# Patient Record
Sex: FEMALE | Race: WHITE | Employment: STUDENT | URBAN - NONMETROPOLITAN AREA
[De-identification: names, ages, dates, MRNs, and addresses within clinical notes are randomized per-mention and may not be internally consistent; named-entity substitution may affect disease eponyms.]

---
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
## 2022-09-10 ENCOUNTER — HOSPITAL ENCOUNTER (OUTPATIENT)
Facility: HOSPITAL | Age: 22
Discharge: HOME | End: 2022-09-10
Attending: OBSTETRICS & GYNECOLOGY | Admitting: OBSTETRICS & GYNECOLOGY

## 2022-09-10 VITALS
SYSTOLIC BLOOD PRESSURE: 123 MMHG | TEMPERATURE: 98 F | RESPIRATION RATE: 16 BRPM | DIASTOLIC BLOOD PRESSURE: 55 MMHG | OXYGEN SATURATION: 99 % | WEIGHT: 154 LBS | BODY MASS INDEX: 27.29 KG/M2 | HEIGHT: 63 IN | HEART RATE: 72 BPM

## 2022-09-10 LAB
ABO GROUP BLD: NORMAL
BLD GP AB SCN SERPL QL: NEGATIVE
HGB F BLD QL KLEIH BETKE: NORMAL
RH BLD: POSITIVE
T&S EXPIRATION DATE: NORMAL

## 2022-09-10 PROCEDURE — 85460 HEMOGLOBIN FETAL: CPT | Performed by: OBSTETRICS & GYNECOLOGY

## 2022-09-10 PROCEDURE — 36415 COLL VENOUS BLD VENIPUNCTURE: CPT | Performed by: OBSTETRICS & GYNECOLOGY

## 2022-09-10 PROCEDURE — 86850 RBC ANTIBODY SCREEN: CPT | Performed by: OBSTETRICS & GYNECOLOGY

## 2022-09-10 PROCEDURE — G0378 HOSPITAL OBSERVATION PER HR: HCPCS

## 2022-09-10 PROCEDURE — G0463 HOSPITAL OUTPT CLINIC VISIT: HCPCS

## 2022-09-10 PROCEDURE — 86901 BLOOD TYPING SEROLOGIC RH(D): CPT | Performed by: OBSTETRICS & GYNECOLOGY

## 2022-09-10 PROCEDURE — 86900 BLOOD TYPING SEROLOGIC ABO: CPT | Performed by: OBSTETRICS & GYNECOLOGY
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
## 2022-09-12 ENCOUNTER — HOSPITAL ENCOUNTER (OUTPATIENT)
Facility: HOSPITAL | Age: 22
Discharge: HOME | End: 2022-09-12
Attending: OBSTETRICS & GYNECOLOGY | Admitting: OBSTETRICS & GYNECOLOGY

## 2022-09-12 ENCOUNTER — HOSPITAL ENCOUNTER (OUTPATIENT)
Facility: HOSPITAL | Age: 22
End: 2022-09-12
Attending: OBSTETRICS & GYNECOLOGY | Admitting: OBSTETRICS & GYNECOLOGY

## 2022-09-12 VITALS
WEIGHT: 153 LBS | BODY MASS INDEX: 28.16 KG/M2 | HEART RATE: 85 BPM | DIASTOLIC BLOOD PRESSURE: 77 MMHG | SYSTOLIC BLOOD PRESSURE: 131 MMHG | RESPIRATION RATE: 16 BRPM | HEIGHT: 62 IN | TEMPERATURE: 98.5 F

## 2022-09-12 PROBLEM — Z34.90 PREGNANCY: Status: ACTIVE | Noted: 2022-09-12

## 2022-09-12 PROCEDURE — G0378 HOSPITAL OBSERVATION PER HR: HCPCS

## 2022-09-12 PROCEDURE — G0463 HOSPITAL OUTPT CLINIC VISIT: HCPCS

## 2022-09-13 NOTE — NURSING NOTE
No vaginal bleeding during observation in LDR. No contractions and pt denies any pain.     Karne Goss RN  22:43 CDT

## 2023-08-05 ENCOUNTER — HOSPITAL ENCOUNTER (EMERGENCY)
Facility: HOSPITAL | Age: 23
Discharge: HOME | End: 2023-08-05
Payer: COMMERCIAL

## 2023-08-05 VITALS
DIASTOLIC BLOOD PRESSURE: 69 MMHG | SYSTOLIC BLOOD PRESSURE: 120 MMHG | TEMPERATURE: 98.2 F | OXYGEN SATURATION: 98 % | RESPIRATION RATE: 16 BRPM | HEART RATE: 58 BPM | BODY MASS INDEX: 31.28 KG/M2 | HEIGHT: 62 IN | WEIGHT: 170 LBS

## 2023-08-05 DIAGNOSIS — S39.012A STRAIN OF LUMBAR PARASPINOUS MUSCLE, INITIAL ENCOUNTER: Primary | ICD-10-CM

## 2023-08-05 PROCEDURE — 25010000002 KETOROLAC TROMETHAMINE PER 15 MG

## 2023-08-05 PROCEDURE — 99283 EMERGENCY DEPT VISIT LOW MDM: CPT

## 2023-08-05 PROCEDURE — 96372 THER/PROPH/DIAG INJ SC/IM: CPT

## 2023-08-05 PROCEDURE — 25010000002 DEXAMETHASONE PER 1 MG

## 2023-08-05 RX ORDER — LIDOCAINE 50 MG/G
1 PATCH TOPICAL EVERY 24 HOURS
Qty: 30 PATCH | Refills: 0 | Status: SHIPPED | OUTPATIENT
Start: 2023-08-05 | End: 2023-09-04

## 2023-08-05 RX ORDER — LIDOCAINE 50 MG/G
1 PATCH TOPICAL ONCE
Status: DISCONTINUED | OUTPATIENT
Start: 2023-08-05 | End: 2023-08-05 | Stop reason: HOSPADM

## 2023-08-05 RX ORDER — KETOROLAC TROMETHAMINE 30 MG/ML
30 INJECTION, SOLUTION INTRAMUSCULAR; INTRAVENOUS EVERY 6 HOURS PRN
Status: DISCONTINUED | OUTPATIENT
Start: 2023-08-05 | End: 2023-08-05 | Stop reason: HOSPADM

## 2023-08-05 RX ORDER — DEXAMETHASONE SODIUM PHOSPHATE 10 MG/ML
10 INJECTION INTRAMUSCULAR; INTRAVENOUS ONCE
Status: COMPLETED | OUTPATIENT
Start: 2023-08-05 | End: 2023-08-05

## 2023-08-05 RX ORDER — CYCLOBENZAPRINE HCL 10 MG
10 TABLET ORAL NIGHTLY PRN
Qty: 5 TABLET | Refills: 0 | Status: SHIPPED | OUTPATIENT
Start: 2023-08-05 | End: 2023-08-10

## 2023-08-05 RX ORDER — ACETAMINOPHEN 500 MG
1000 TABLET ORAL ONCE
Status: COMPLETED | OUTPATIENT
Start: 2023-08-05 | End: 2023-08-05

## 2023-08-05 RX ADMIN — LIDOCAINE PATCH 5% 1 PATCH: 700 PATCH TOPICAL at 13:57

## 2023-08-05 RX ADMIN — KETOROLAC TROMETHAMINE 30 MG: 30 INJECTION, SOLUTION INTRAMUSCULAR; INTRAVENOUS at 13:59

## 2023-08-05 RX ADMIN — ACETAMINOPHEN 1000 MG: 500 TABLET, FILM COATED ORAL at 13:58

## 2023-08-05 RX ADMIN — DEXAMETHASONE SODIUM PHOSPHATE 10 MG: 10 INJECTION INTRAMUSCULAR; INTRAVENOUS at 13:58

## 2023-08-05 NOTE — DISCHARGE INSTRUCTIONS
It was very nice to meet you, Reena. Thank you for allowing us to take care of you today at [facility].    Your evaluation today did not show any emergent findings or have any emergent indications for admission to the hospital.     Please understand that an ER evaluation is just the start of your evaluation. We do the best we can, but we are often unable to fully figure out what is causing your symptoms from one evaluation. Thus, our goal is to determine whether you need to be evaluated in the hospital or if it is safe for you to go home and see other doctors such as a primary care physician or a specialist on an outpatient basis.     Like we discussed, it is very important that you follow up with your primary care doctor (call them to set up an appointment) within the next few days or as soon as possible so that you can be re-evaluated for improvement in your symptoms or for any other questions.     A copy of your results should be included in your paperwork. I have prescribed lidocaine patches that you may apply to the back as needed for pain relief as well as muscle relaxers called Flexeril.  I do recommend that you take these at night as they can cause drowsiness.  Also, you may use moist heat compresses for pain relief as well.    Please return to the emergency room within 12-48 hours if you experience fever, chills, chest pain or shortness of breath, pain with inspiration/expiration, pain that travels to your arms, neck or back, nausea, vomiting, severe headache, tearing pain in your chest, dizziness, feel as though you are about to pass out, have any worsening symptoms, or any other concerns.

## 2023-08-05 NOTE — Clinical Note
[facility] EMERGENCY DEPARTMENT  [address]  [facility] [address]  Phone: [phone]    Reena Blankenship was seen and treated in our emergency department on 8/5/2023.  She may return to work on 08/05/2023.         Thank you for choosing [facility].    Briana Guillen, APRN

## 2023-08-07 NOTE — ED PROVIDER NOTES
Subjective   History of Present Illness  Patient is a 23-year-old female that presents emergency department for back pain.  Patient reports that approximately 4 days ago she was at work when she bent down to tie her shoe.  She states that when she stood up she began having discomfort in the lower back.  Patient reports that over the last few days discomfort has worsened and has become tender to touch.  Patient reports that at times she does have pain that radiates from her lower back down into her left buttocks and down her leg.  Patient denies any fall or known injury.  She denies any past medical history.  She states that she is able to ambulate but when the pain radiates into the left leg she states that walking becomes increasingly difficult due to pain.  Patient denies any fevers.  She denies any numbness or tingling.  She denies any loss of sensation or bowel or bladder incontinence.  Patient is able to perform straight leg raises without difficulty.    Review of Systems   Musculoskeletal:  Positive for back pain.   All other systems reviewed and are negative.    History reviewed. No pertinent past medical history.    No Known Allergies    Past Surgical History:   Procedure Laterality Date    APPENDECTOMY  2020    EAR TUBES      childhood       History reviewed. No pertinent family history.    Social History     Socioeconomic History    Marital status: Single   Tobacco Use    Smoking status: Former     Types: Cigarettes   Vaping Use    Vaping Use: Never used   Substance and Sexual Activity    Drug use: Not Currently     Types: Marijuana     Comment: beginning of pregnancy           Objective   Physical Exam  Vitals and nursing note reviewed.   Constitutional:       Appearance: Normal appearance.      Comments: Nontoxic appearing. In no acute distress.    HENT:      Head: Normocephalic and atraumatic.      Right Ear: External ear normal.      Left Ear: External ear normal.      Nose: Nose normal.       Mouth/Throat:      Mouth: Mucous membranes are moist.      Pharynx: Oropharynx is clear.   Eyes:      Extraocular Movements: Extraocular movements intact.      Conjunctiva/sclera: Conjunctivae normal.      Pupils: Pupils are equal, round, and reactive to light.   Cardiovascular:      Rate and Rhythm: Normal rate and regular rhythm.      Pulses: Normal pulses.      Heart sounds: Normal heart sounds.   Pulmonary:      Effort: Pulmonary effort is normal. No respiratory distress.      Breath sounds: Normal breath sounds. No wheezing.   Chest:      Chest wall: No tenderness.   Abdominal:      General: Abdomen is flat. Bowel sounds are normal. There is no distension.      Palpations: Abdomen is soft.      Tenderness: There is no abdominal tenderness. There is no right CVA tenderness, left CVA tenderness, guarding or rebound.   Musculoskeletal:         General: Normal range of motion.      Cervical back: Normal, normal range of motion and neck supple. No rigidity or tenderness.      Thoracic back: Normal.      Lumbar back: Tenderness present. No swelling, edema, deformity, signs of trauma or bony tenderness. Negative right straight leg raise test and negative left straight leg raise test.      Right lower leg: No edema.      Left lower leg: No edema.      Comments: Patient does report tenderness upon palpation to the lower lumbar back.  She is able to perform a straight leg raise without difficulty to bilateral lower extremities.  No swelling, erythema, or deformity noted to the spine.  She reports full sensation to bilateral lower extremities.  She is able to ambulate without difficulty.  Patient does appear to be neurovascularly intact.   Skin:     General: Skin is warm and dry.      Capillary Refill: Capillary refill takes less than 2 seconds.   Neurological:      General: No focal deficit present.      Mental Status: She is alert and oriented to person, place, and time. Mental status is at baseline.      Sensory:  Sensation is intact.      Motor: Motor function is intact.      Coordination: Coordination is intact.      Gait: Gait is intact.      Deep Tendon Reflexes: Reflexes are normal and symmetric.   Psychiatric:         Attention and Perception: Attention and perception normal.         Mood and Affect: Mood and affect normal.         Speech: Speech normal.         Behavior: Behavior normal. Behavior is cooperative.         Thought Content: Thought content normal.         Judgment: Judgment normal.       Procedures           ED Course                                           Medical Decision Making  Reena Blankenship is a 23 y.o. female who presents to the ED for back pain.  Patient reports that approximately 4 days ago she was at work when she bent down to tie her shoe.  She states that when she stood up she began having discomfort in the lower back.  Patient reports that over the last few days discomfort has worsened and has become tender to touch.  Patient reports that at times she does have pain that radiates from her lower back down into her left buttocks and down her leg.  Patient denies any fall or known injury.  She denies any past medical history.  She states that she is able to ambulate but when the pain radiates into the left leg she states that walking becomes increasingly difficult due to pain.  Patient denies any fevers.  She denies any numbness or tingling.  She denies any loss of sensation or bowel or bladder incontinence.  Patient is able to perform straight leg raises without difficulty.    Patient was non-toxic appearing on arrival. No acute distress was noted. Past medical history, surgical history, and medication regimen reviewed.     Vital signs stable.  Patient is afebrile.  No tachycardia noted.    Patient's presentation raises suspicion for differentials including, but not limited to, lumbar muscle strain, sciatica, spinal stenosis, cauda equina.     Medications administered during ED encounter  include the following,   acetaminophen (TYLENOL) tablet 1,000 mg   dexAMETHasone (DECADRON) injection 10 mg   30 mg of Toradol IM  Lidocaine patch was applied to the lower back. On re-evaluation, patient remained hemodynamically stable.  Patient reports relief in back pain.    Given findings described above, patient's presentation is likely consistent with muscle strain of the lumbar spine. I have a low suspicion for fracture, cauda equina at this point in their ED course.       I discussed with the patient that her story of progressive pain over the last 4 days with no injury in addition to a normal neuro exam with no deficits and only muscular tenderness along the spine. Plus no loss of bowel or bladder or saddle paresthesias, accompanied by the ability to ambulate without difficulty are all reassuring signs that this is more than likely related to a muscle strain in the lumbar spine.  I did discuss the possibility of imaging with the patient but through shared decision-making she is in agreement with no CT at this time and being discharged home with lidocaine patches and muscle relaxers and trialing these for pain relief.  I also discussed with the patient that she may use Tylenol Motrin as needed as well as moist heat applied to the affected area.  Ultimately, I encouraged the patient to refrain from any heavy lifting or vigorous activity to allow inflammation to decrease to the area.  We also had a very in-depth discussion regarding signs and symptoms that would warrant a quick return to the ED. I answered all the questions regarding the emergency department evaluation, diagnosis, and treatment plan in plain and simple language that was understandable. I said that there is always some diagnostic uncertainty in the ER and went over the fact that the symptoms may change or new symptoms may reveal themselves after being discharged. Because of this, I said that it is very important that Reena follows up, by  calling as soon as possible to set up an appointment, with her primary care doctor within the next few days or as soon as reasonably possible so that the symptoms can be re-evaluated for improvement or for any other questions. I also gave Reena common sense return precautions and encouraged a quick return to the emergency department within 24 - 48hrs if there are any new, worsening, or concerning symptoms. The patient verbalized understanding of the discharge instructions and agreed with them. Reena was discharged in stable condition and was observed ambulating out of the ER.    Problems Addressed:  Strain of lumbar paraspinous muscle, initial encounter: complicated acute illness or injury    Risk  OTC drugs.  Prescription drug management.        Final diagnoses:   Strain of lumbar paraspinous muscle, initial encounter       ED Disposition  ED Disposition       ED Disposition   Discharge    Condition   Stable    Comment   --               Sue Willett, APRN  [address] [facility] [place] [id number] [phone]          [facility] EMERGENCY DEPARTMENT  [address] [place] [address] [phone]    If symptoms worsen         Medication List        New Prescriptions      cyclobenzaprine 10 MG tablet  Commonly known as: FLEXERIL  Take 1 tablet by mouth At Night As Needed for Muscle Spasms for up to 5 days.     lidocaine 5 %  Commonly known as: LIDODERM  Place 1 patch on the skin as directed by provider Daily for 30 days. Remove & Discard patch within 12 hours or as directed by MD               Where to Get Your Medications        These medications were sent to [facility]/pharmacy #[id number] - [place], [place] - [address] AT [address]CROSS [address]  [phone]  - [phone]   [address] [place] [address]      Phone: [phone]   cyclobenzaprine 10 MG tablet  lidocaine 5 %            Briana Guillen, APRN  08/06/23 3947